# Patient Record
Sex: FEMALE | Employment: UNEMPLOYED
[De-identification: names, ages, dates, MRNs, and addresses within clinical notes are randomized per-mention and may not be internally consistent; named-entity substitution may affect disease eponyms.]

---

## 2017-07-01 ENCOUNTER — HEALTH MAINTENANCE LETTER (OUTPATIENT)
Age: 32
End: 2017-07-01

## 2023-06-20 ENCOUNTER — OFFICE VISIT (OUTPATIENT)
Dept: URGENT CARE | Facility: URGENT CARE | Age: 38
End: 2023-06-20
Payer: COMMERCIAL

## 2023-06-20 ENCOUNTER — HOSPITAL ENCOUNTER (EMERGENCY)
Facility: CLINIC | Age: 38
Discharge: HOME | End: 2023-06-20
Attending: EMERGENCY MEDICINE | Admitting: EMERGENCY MEDICINE
Payer: COMMERCIAL

## 2023-06-20 ENCOUNTER — APPOINTMENT (OUTPATIENT)
Dept: ULTRASOUND IMAGING | Facility: CLINIC | Age: 38
End: 2023-06-20
Attending: EMERGENCY MEDICINE
Payer: COMMERCIAL

## 2023-06-20 ENCOUNTER — APPOINTMENT (OUTPATIENT)
Dept: CT IMAGING | Facility: CLINIC | Age: 38
End: 2023-06-20
Attending: EMERGENCY MEDICINE
Payer: COMMERCIAL

## 2023-06-20 VITALS
SYSTOLIC BLOOD PRESSURE: 120 MMHG | BODY MASS INDEX: 26.05 KG/M2 | DIASTOLIC BLOOD PRESSURE: 77 MMHG | HEART RATE: 88 BPM | OXYGEN SATURATION: 100 % | HEIGHT: 63 IN | TEMPERATURE: 98.1 F | RESPIRATION RATE: 18 BRPM | WEIGHT: 147 LBS

## 2023-06-20 VITALS
DIASTOLIC BLOOD PRESSURE: 84 MMHG | HEART RATE: 68 BPM | WEIGHT: 147.9 LBS | TEMPERATURE: 97.9 F | SYSTOLIC BLOOD PRESSURE: 118 MMHG | BODY MASS INDEX: 25.39 KG/M2 | OXYGEN SATURATION: 100 %

## 2023-06-20 DIAGNOSIS — E25.0 CONGENITAL ADRENAL HYPERPLASIA (H): Primary | ICD-10-CM

## 2023-06-20 DIAGNOSIS — R31.9 HEMATURIA, UNSPECIFIED TYPE: ICD-10-CM

## 2023-06-20 DIAGNOSIS — R60.0 BILATERAL LOWER EXTREMITY EDEMA: ICD-10-CM

## 2023-06-20 DIAGNOSIS — R60.0 PERIPHERAL EDEMA: ICD-10-CM

## 2023-06-20 LAB
ALBUMIN SERPL BCG-MCNC: 4.2 G/DL (ref 3.5–5.2)
ALBUMIN UR-MCNC: NEGATIVE MG/DL
ALP SERPL-CCNC: 63 U/L (ref 35–104)
ALT SERPL W P-5'-P-CCNC: 11 U/L (ref 0–50)
AMORPH CRY #/AREA URNS HPF: ABNORMAL /HPF
ANION GAP SERPL CALCULATED.3IONS-SCNC: 12 MMOL/L (ref 7–15)
APPEARANCE UR: ABNORMAL
AST SERPL W P-5'-P-CCNC: 17 U/L (ref 0–45)
BACTERIA #/AREA URNS HPF: ABNORMAL /HPF
BASOPHILS # BLD AUTO: 0.1 10E3/UL (ref 0–0.2)
BASOPHILS NFR BLD AUTO: 1 %
BILIRUB SERPL-MCNC: 0.5 MG/DL
BILIRUB UR QL STRIP: NEGATIVE
BUN SERPL-MCNC: 12.2 MG/DL (ref 6–20)
CALCIUM SERPL-MCNC: 8.9 MG/DL (ref 8.6–10)
CHLORIDE SERPL-SCNC: 104 MMOL/L (ref 98–107)
COLOR UR AUTO: YELLOW
CREAT SERPL-MCNC: 0.63 MG/DL (ref 0.51–0.95)
DEPRECATED HCO3 PLAS-SCNC: 25 MMOL/L (ref 22–29)
EOSINOPHIL # BLD AUTO: 0.1 10E3/UL (ref 0–0.7)
EOSINOPHIL NFR BLD AUTO: 1 %
ERYTHROCYTE [DISTWIDTH] IN BLOOD BY AUTOMATED COUNT: 12.9 % (ref 10–15)
GFR SERPL CREATININE-BSD FRML MDRD: >90 ML/MIN/1.73M2
GLUCOSE SERPL-MCNC: 86 MG/DL (ref 70–99)
GLUCOSE UR STRIP-MCNC: NEGATIVE MG/DL
HCG SERPL QL: NEGATIVE
HCT VFR BLD AUTO: 37.3 % (ref 35–47)
HGB BLD-MCNC: 12.2 G/DL (ref 11.7–15.7)
HGB UR QL STRIP: ABNORMAL
IMM GRANULOCYTES # BLD: 0 10E3/UL
IMM GRANULOCYTES NFR BLD: 0 %
KETONES UR STRIP-MCNC: 20 MG/DL
LEUKOCYTE ESTERASE UR QL STRIP: ABNORMAL
LYMPHOCYTES # BLD AUTO: 1.9 10E3/UL (ref 0.8–5.3)
LYMPHOCYTES NFR BLD AUTO: 21 %
MCH RBC QN AUTO: 29.4 PG (ref 26.5–33)
MCHC RBC AUTO-ENTMCNC: 32.7 G/DL (ref 31.5–36.5)
MCV RBC AUTO: 90 FL (ref 78–100)
MONOCYTES # BLD AUTO: 0.7 10E3/UL (ref 0–1.3)
MONOCYTES NFR BLD AUTO: 8 %
MUCOUS THREADS #/AREA URNS LPF: PRESENT /LPF
NEUTROPHILS # BLD AUTO: 6.4 10E3/UL (ref 1.6–8.3)
NEUTROPHILS NFR BLD AUTO: 69 %
NITRATE UR QL: NEGATIVE
NRBC # BLD AUTO: 0 10E3/UL
NRBC BLD AUTO-RTO: 0 /100
NT-PROBNP SERPL-MCNC: 68 PG/ML (ref 0–450)
PH UR STRIP: 7.5 [PH] (ref 5–7)
PLATELET # BLD AUTO: 325 10E3/UL (ref 150–450)
POTASSIUM SERPL-SCNC: 3.4 MMOL/L (ref 3.4–5.3)
PROT SERPL-MCNC: 6.7 G/DL (ref 6.4–8.3)
RBC # BLD AUTO: 4.15 10E6/UL (ref 3.8–5.2)
RBC URINE: >182 /HPF
SODIUM SERPL-SCNC: 141 MMOL/L (ref 136–145)
SP GR UR STRIP: 1.02 (ref 1–1.03)
TSH SERPL DL<=0.005 MIU/L-ACNC: 0.48 UIU/ML (ref 0.3–4.2)
UROBILINOGEN UR STRIP-MCNC: NORMAL MG/DL
WBC # BLD AUTO: 9.2 10E3/UL (ref 4–11)
WBC URINE: 7 /HPF

## 2023-06-20 PROCEDURE — 84443 ASSAY THYROID STIM HORMONE: CPT | Performed by: EMERGENCY MEDICINE

## 2023-06-20 PROCEDURE — 81001 URINALYSIS AUTO W/SCOPE: CPT | Performed by: EMERGENCY MEDICINE

## 2023-06-20 PROCEDURE — 93970 EXTREMITY STUDY: CPT

## 2023-06-20 PROCEDURE — 84703 CHORIONIC GONADOTROPIN ASSAY: CPT | Performed by: EMERGENCY MEDICINE

## 2023-06-20 PROCEDURE — 99285 EMERGENCY DEPT VISIT HI MDM: CPT | Mod: 25

## 2023-06-20 PROCEDURE — 36415 COLL VENOUS BLD VENIPUNCTURE: CPT | Performed by: EMERGENCY MEDICINE

## 2023-06-20 PROCEDURE — 250N000011 HC RX IP 250 OP 636: Performed by: EMERGENCY MEDICINE

## 2023-06-20 PROCEDURE — 74177 CT ABD & PELVIS W/CONTRAST: CPT

## 2023-06-20 PROCEDURE — 83880 ASSAY OF NATRIURETIC PEPTIDE: CPT | Performed by: EMERGENCY MEDICINE

## 2023-06-20 PROCEDURE — 85025 COMPLETE CBC W/AUTO DIFF WBC: CPT | Performed by: EMERGENCY MEDICINE

## 2023-06-20 PROCEDURE — 99204 OFFICE O/P NEW MOD 45 MIN: CPT | Performed by: PHYSICIAN ASSISTANT

## 2023-06-20 PROCEDURE — 80053 COMPREHEN METABOLIC PANEL: CPT | Performed by: EMERGENCY MEDICINE

## 2023-06-20 RX ORDER — IOPAMIDOL 755 MG/ML
500 INJECTION, SOLUTION INTRAVASCULAR ONCE
Status: COMPLETED | OUTPATIENT
Start: 2023-06-20 | End: 2023-06-20

## 2023-06-20 RX ADMIN — IOPAMIDOL 74 ML: 755 INJECTION, SOLUTION INTRAVENOUS at 21:09

## 2023-06-20 ASSESSMENT — ACTIVITIES OF DAILY LIVING (ADL)
ADLS_ACUITY_SCORE: 35
ADLS_ACUITY_SCORE: 33

## 2023-06-20 ASSESSMENT — PAIN SCALES - GENERAL: PAINLEVEL: SEVERE PAIN (6)

## 2023-06-20 NOTE — PATIENT INSTRUCTIONS
June 20, 2023 [facility] Plan:     I have advised you go to the emergency room now for evaluation of your worsening lower leg swelling.     Your medical record shows you have a history of congenital adrenal hyperplasia. Please share that information with the emergency room doctor.

## 2023-06-20 NOTE — ED TRIAGE NOTES
Pt presents for evaluation of BLE swelling for last 2-3 weeks. Has been getting progressively worse. Denies any chest pain or shortness of breath. Went to [facility] and was sent for further evaluation. Pt denies any urinary issues. Hx of congenital adrenal hyperplasia.

## 2023-06-20 NOTE — PROGRESS NOTES
"  ASSESSMENT/PLAN:    (E25.0) Congenital adrenal hyperplasia (H)  (primary encounter diagnosis)    MDM: 38-year-old female with report of acute, progressive, bilateral, lower extremity edema x 1 week duration.  Patient appears to have very limited knowledge of her medical history. Obtaining history was difficult today.  Patient's primary care provider has a history of PCOS, amenorrhea, hirsutism, abnormal thyroid function testing congenital adrenal hyperplasia.  Patient is unable to provide any additional insight into any of the above diagnoses.  She is able to tell me she has \"prediabetes\".  I do also see, in epic, she is receiving occupational therapy with a diagnosis of cognitive complaints and learning disabilities.  I referred her to the emergency room today for further evaluation metabolic, thyroid, and CT of abdomen/pelvis to image adrenal glands is possible, if the emergency room physician feels that work-up is appropriate.    Plan:     June 20, 2023 [facility] Plan:     I have advised you go to the emergency room now for evaluation of your worsening lower leg swelling.     Your medical record shows you have a history of congenital adrenal hyperplasia. Please share that information with the emergency room doctor.     (R60.0) Bilateral lower extremity edema    -------------------------      SUBJECTIVE:      Tess Umanzor presents to urgent care today, accompanied by a friend, for evaluation of progressively worsening, bilateral, foot and lower leg swelling for approximately 1 week duration.  Patient states it is now comfortable for her to walk due to swelling and that her she was are very tight due to swelling.      Patient states she has not had this previously.  Denies any prolonged standing.  Denies any acute chest pain or acute shortness of breath.          Past Medical History:   Diagnosis Date     Urticaria, unspecified      Patient Active Problem List   Diagnosis     Polycystic Ovary Syndrome     " CARDIOVASCULAR SCREENING; LDL GOAL LESS THAN 160     Amenorrhea     Hirsutism     Thyroid function test abnormal     Congenital anomaly of adrenal gland     Congenital adrenal hyperplasia (H)     Past Medical History:   Diagnosis Date     Urticaria, unspecified        Current Outpatient Medications   Medication     Multiple vitamin  s TABS     norgestimate-ethinyl estradiol (ORTHO-CYCLEN, SPRINTEC) 0.25-35 MG-MCG tablet     No current facility-administered medications for this visit.     No Known Allergies    OBJECTIVE:  /84 (BP Location: Right arm, Patient Position: Sitting, Cuff Size: Adult Regular)   Pulse 68   Temp 97.9  F (36.6  C) (Oral)   Wt 67.1 kg (147 lb 14.4 oz)   SpO2 100%   BMI 25.39 kg/m      General appearance: alert and no apparent distress  Skin color is pink and without rash.  HEENT:   Conjunctiva not injected.  Sclera clear.  NECK Trachea is midline. No JVD.   CARDIAC:NORMAL - regular rate and rhythm without murmur.  RESP: Normal - CTA without rales, rhonchi, or wheezing.  LE: Positive for swelling of feet and lower legs to below the knee bilaterally. Cracked, desquamating sking

## 2023-06-21 NOTE — DISCHARGE INSTRUCTIONS
"What do you do next:   Continue your home medications unless we have specifically changed them  Decrease your salt intake  Elevate your legs at night  Consider using \"compression stockings\" as these can help to squeeze the fluid out of your legs  Follow up as indicated below  It may be reasonable to consider medication to help the swelling if the above items do not help  It may also be reasonable to consider an \"Echocardiogram\" (ultrasound of your heart) as well    When do you return: If you have controllable pain, severe shortness of breath, bluish/purplish discoloration of your legs, or any other symptoms that concern you, please return to the ED for reevaluation.    Thank you for allowing us to care for you today.    "

## 2023-06-21 NOTE — ED NOTES
"PIT/Triage Evaluation    Patient presented from urgent care due to bilateral leg swelling that has been progressive for the last 2 to 3 weeks.  The patient denies chest pain or shortness of breath.  She was initially seen at urgent care and was sent here for further evaluation.        Brief record review notes a history of congenital adrenal hyperplasia, polycystic ovaries, as well as some cognitive issues.  The patient denies pain in her legs.    Exam is notable for:    Patient Vitals for the past 24 hrs:   BP Temp Temp src Pulse Resp SpO2 Height Weight   06/20/23 1732 121/78 98.1  F (36.7  C) Oral 92 18 100 % 1.6 m (5' 3\") 66.7 kg (147 lb)       Cardiovascular: normal rate, Regular rhythm and normal heart sounds.  No murmur heard. Equal B/L peripheral pulses.  Pulmonary/Chest: Effort normal and breath sounds normal. No respiratory distress. Patient has no wheezes. Patient has no rales.   Musculoskeletal/Extremities: B/L LE pitting edema noted. Normal tone  Skin: Skin is warm and dry. There is no diaphoresis noted.         Appropriate interventions for symptom management were initiated if applicable.  Appropriate diagnostic tests were initiated if indicated.    Important information for subsequent clinician:    Review primary care note from 3/30/2023 as well as urgent care note from today.    By the time of my evaluation, labs have been ordered and everything including TSH and BNP was reassuring.    The patient has a history of congenital adrenal hyperplasia and it does not appear she is on any medications.  The patient does not appear to have any electrolyte issues.  I do note hematuria in her urinalysis so CT was ordered to further evaluate for that.  Perhaps there is some kind of an acute adrenal gland issue or hemorrhage.  Ultrasounds of bilateral lower extremities were ordered as well.    Please see primary care note from 3/30/2023.    I briefly evaluated the patient and developed an initial plan of care. I " discussed this plan and explained that this brief interaction does not constitute a full evaluation. Patient/family understands that they should wait to be fully evaluated and discuss any test results with another clinician prior to leaving the hospital.     Jules Newton DO  06/20/23 203

## 2023-06-21 NOTE — ED PROVIDER NOTES
"  History     Chief Complaint:  Leg Swelling       HPI   Tess Umanzor is a 38 year old female who presents with leg swelling. The patient was seen at  today and recommended to present to the ED for further evaluation. She endorses bilateral leg swelling that has been progressive over the last 2 to 3 weeks. The patient denies chest pain, pain in her legs, and shortness of breath. Tess notes a history of congenital adrenal hyperplasia, polycystic ovaries, and cognitive issues.      Independent Historian:    None - Patient Only    Review of External Notes:  I reviewed the patient's  note from 6/20/23    ROS:  See HPI    Allergies:  No Known Allergies     Physical Exam     Patient Vitals for the past 24 hrs:   BP Temp Temp src Pulse Resp SpO2 Height Weight   06/20/23 2319 120/77 -- -- 88 18 100 % -- --   06/20/23 1732 121/78 98.1  F (36.7  C) Oral 92 18 100 % 1.6 m (5' 3\") 66.7 kg (147 lb)        Physical Exam  Cardiovascular: normal rate, Regular rhythm and normal heart sounds.  No murmur heard. Equal B/L peripheral pulses.  Pulmonary/Chest: Effort normal and breath sounds normal. No respiratory distress. Patient has no wheezes. Patient has no rales.   Musculoskeletal/Extremities: B/L LE pitting edema noted. Normal tone  Skin: Skin is warm and dry. There is no diaphoresis noted.     Emergency Department Course     Imaging:  US Lower Extremity Venous Duplex Bilateral   Final Result   IMPRESSION:   1.  No deep venous thrombosis in the bilateral lower extremities.      CT Abdomen Pelvis w Contrast   Final Result   IMPRESSION:    1.  Multiple hepatic cysts.      2.  Normal appendix.         Report per radiology    Laboratory:  Labs Ordered and Resulted from Time of ED Arrival to Time of ED Departure   ROUTINE UA WITH MICROSCOPIC REFLEX TO CULTURE - Abnormal       Result Value    Color Urine Yellow      Appearance Urine Cloudy (*)     Glucose Urine Negative      Bilirubin Urine Negative      Ketones Urine 20 (*)     " Specific Gravity Urine 1.018      Blood Urine Large (*)     pH Urine 7.5 (*)     Protein Albumin Urine Negative      Urobilinogen Urine Normal      Nitrite Urine Negative      Leukocyte Esterase Urine Small (*)     Bacteria Urine Few (*)     Mucus Urine Present (*)     Amorphous Crystals Urine Few (*)     RBC Urine >182 (*)     WBC Urine 7 (*)    NT PROBNP INPATIENT - Normal    N terminal Pro BNP Inpatient 68     COMPREHENSIVE METABOLIC PANEL - Normal    Sodium 141      Potassium 3.4      Chloride 104      Carbon Dioxide (CO2) 25      Anion Gap 12      Urea Nitrogen 12.2      Creatinine 0.63      Calcium 8.9      Glucose 86      Alkaline Phosphatase 63      AST 17      ALT 11      Protein Total 6.7      Albumin 4.2      Bilirubin Total 0.5      GFR Estimate >90     HCG QUALITATIVE PREGNANCY - Normal    hCG Serum Qualitative Negative     TSH WITH FREE T4 REFLEX - Normal    TSH 0.48     CBC WITH PLATELETS AND DIFFERENTIAL    WBC Count 9.2      RBC Count 4.15      Hemoglobin 12.2      Hematocrit 37.3      MCV 90      MCH 29.4      MCHC 32.7      RDW 12.9      Platelet Count 325      % Neutrophils 69      % Lymphocytes 21      % Monocytes 8      % Eosinophils 1      % Basophils 1      % Immature Granulocytes 0      NRBCs per 100 WBC 0      Absolute Neutrophils 6.4      Absolute Lymphocytes 1.9      Absolute Monocytes 0.7      Absolute Eosinophils 0.1      Absolute Basophils 0.1      Absolute Immature Granulocytes 0.0      Absolute NRBCs 0.0          Emergency Department Course & Assessments:    Interventions:  Medications   iopamidol (ISOVUE-370) solution 500 mL (74 mLs Intravenous $Given 6/20/23 2109)   sodium chloride (PF) 0.9% PF flush 100 mL (65 mLs Intravenous $Given 6/20/23 2114)        Assessments, Independent Interpretation, Consult/Discussion of ManagementTests:  ED Course as of 06/21/23 0156   Tue Jun 20, 2023 2158 I obtained history and examined the patient as noted above   2229 Rechecked and updated.        Social Determinants of Health affecting care:  None    Disposition:  The patient was discharged to home.     Impression & Plan    CMS Diagnoses: None    Code Status: No Order    Medical Decision Making:    This 38 year old female presents to the ED due to Leg Swelling   . Please see the HPI and exam for specifics. A broad differential was considered including electrolyte issues, DVT, infectious etiology, etc.    Based on the differential, exam, and any decision tools, the above workup was undertaken. Lab and imaging results were reviewed by me and are notable for reassuring BNP and metabolic panel.  There is no evidence of sodium wasting in the setting of the patient's documented adrenal hyperplasia.  Urinalysis does show hematuria without gross evidence of infection.    Ultrasound does not demonstrate DVT and CT does not demonstrate any acute intra-abdominal abnormality.    I do not have a specific rationale for the patient's symptoms.  It is possible that sodium intake could do this or that she might have need for an echocardiogram in the outpatient setting.  I would not start diuretics at this point but I think that primary care follow-up, consideration for echocardiogram, and then discussion about diuretic medication would be reasonable.    Diagnosis:    ICD-10-CM    1. Peripheral edema  R60.9       2. Hematuria, unspecified type  R31.9            IMing, am serving as a scribe at 11:03 PM on 6/20/2023 to document services personally performed by Jules Newton DO based on my observations and the provider's statements to me.   6/20/2023   Jules Newton DO Burns, Bradley Joseph, DO  06/21/23 0158